# Patient Record
Sex: MALE | Race: NATIVE HAWAIIAN OR OTHER PACIFIC ISLANDER | NOT HISPANIC OR LATINO | URBAN - METROPOLITAN AREA
[De-identification: names, ages, dates, MRNs, and addresses within clinical notes are randomized per-mention and may not be internally consistent; named-entity substitution may affect disease eponyms.]

---

## 2022-06-25 ENCOUNTER — EMERGENCY (EMERGENCY)
Facility: HOSPITAL | Age: 42
LOS: 0 days | Discharge: HOME | End: 2022-06-26
Attending: EMERGENCY MEDICINE | Admitting: EMERGENCY MEDICINE

## 2022-06-25 VITALS
OXYGEN SATURATION: 97 % | SYSTOLIC BLOOD PRESSURE: 119 MMHG | TEMPERATURE: 97 F | DIASTOLIC BLOOD PRESSURE: 70 MMHG | HEART RATE: 112 BPM

## 2022-06-25 VITALS
OXYGEN SATURATION: 98 % | RESPIRATION RATE: 18 BRPM | DIASTOLIC BLOOD PRESSURE: 56 MMHG | SYSTOLIC BLOOD PRESSURE: 108 MMHG | HEART RATE: 83 BPM | TEMPERATURE: 98 F

## 2022-06-25 DIAGNOSIS — F12.920 CANNABIS USE, UNSPECIFIED WITH INTOXICATION, UNCOMPLICATED: ICD-10-CM

## 2022-06-25 PROCEDURE — 93010 ELECTROCARDIOGRAM REPORT: CPT

## 2022-06-25 PROCEDURE — 99284 EMERGENCY DEPT VISIT MOD MDM: CPT

## 2022-06-25 NOTE — ED ADULT NURSE NOTE - OBJECTIVE STATEMENT
Pt BIBA from home when patient was found unresponsive by parents. Upon EMS arrival pt was awake and alert. PT admits taking x6 THC gummies 500mg each with his normal home meds percocet. Pt denies any suicidal or homicidal ideation. Denies to wanting to hurt self or others. PT alert and cooperative in triage.

## 2022-06-25 NOTE — ED ADULT TRIAGE NOTE - CHIEF COMPLAINT QUOTE
Pt BIBA from home when patient was found unresponsive by parents. Upon EMS arrival pt was awake and alert. PT admits taking x6 THC gummies 500mg each with his normal home meds. Pt denies any suicidal or homicidal ideation. Denies to wanting to hurt self or others. PT alert and cooperative in triage.

## 2022-06-25 NOTE — ED PROVIDER NOTE - OBJECTIVE STATEMENT
Patient is a 41-year-old male, brought in by EMS for CBD ingestion. Per EMS patient's parents I found him not responding and became concerned prompting calling 911. In the ED, patient is alert, oriented X4, reporting that he had six CBD gummies, 10 mg each, despite being told try one at a time. States this is the first and only time he has ever ingested CBD. Denies any co-ingestion, denies any other substance use, including alcohol and tobacco. Feels well and has no complaints. Has complete recollection of the event, but states he does not know why he was unable to respond earlier. No other complaints - no fever/chills, rhinorrhea, sore throat, CP, palpitations, SOB, cough, abd pain, NVD, dysuria, hematuria, new joint pain, FND, rash, trauma.

## 2022-06-25 NOTE — ED PROVIDER NOTE - PHYSICAL EXAMINATION
_  CONSTITUTIONAL: NAD  SKIN: Warm, dry  HEAD: NCAT  EYES: Clear conjunctiva, PERRLA B/L  ENT: MMM  NECK: Supple  CARD: RRR, S1, S2; no M/R/G  RESP: Speaking in full sentences; CTAB  ABD: S/NT, no R/G  EXT: No pedal edema  NEURO: Grossly intact  PSYCH: Cooperative, appropriate

## 2022-06-25 NOTE — ED ADULT NURSE NOTE - NSIMPLEMENTINTERV_GEN_ALL_ED
Implemented All Fall Risk Interventions:  [place] to call system. Call bell, personal items and telephone within reach. Instruct patient to call for assistance. Room bathroom lighting operational. Non-slip footwear when patient is off stretcher. Physically safe environment: no spills, clutter or unnecessary equipment. Stretcher in lowest position, wheels locked, appropriate side rails in place. Provide visual cue, wrist band, yellow gown, etc. Monitor gait and stability. Monitor for mental status changes and reorient to person, place, and time. Review medications for side effects contributing to fall risk. Reinforce activity limits and safety measures with patient and family.

## 2022-06-25 NOTE — ED PROVIDER NOTE - PATIENT PORTAL LINK FT
You can access the FollowMyHealth Patient Portal offered by [facility] by registering at the following website: [email or web address]. By joining [employer]’s FollowMyHealth portal, you will also be able to view your health information using other applications (apps) compatible with our system.

## 2022-06-25 NOTE — ED PROVIDER NOTE - NSFOLLOWUPINSTRUCTIONS_ED_ALL_ED_FT
Your visit in the emergency department today did not reveal anything immediately life-threatening.    However, it is important that you follow-up with your PRIMARY CARE PHYSICIAN within 3-5 days.  If you do not have a primary care physician, please call your health insurance to select one.  If you do not have health insurance, please schedule an appointment with the [facility]: [phone], located at [address] [place] [address].    ---------------------------------------------------------------------------------------------------    Accidental Drug Poisoning, Adult  Accidental drug poisoning happens when a person accidentally takes too much of a substance, such as a prescription medicine, an over-the-counter medicine, a vitamin, a supplement, or an illegal drug. The effects of drug poisoning can be mild, dangerous, or even deadly.    What are the causes?  This condition is caused by taking too much of a medicine, illegal drug, or other substance. It often results from:  •Lack of knowledge about a substance.  •Using more than one substance at the same time.  •An error made by the health care provider who prescribed the substance.  •An error made by the pharmacist who filled the prescription.  •A lapse in memory, such as forgetting that you have already taken a dose of the medicine.  •Suddenly using a substance after a long period of not using it.    The following substances and medicines are more likely to cause an accidental drug poisoning:  •Medicines that treat mental problems (psychotropic medicines).  •Pain medicines.  •Cocaine.  •Heroin.  •Multivitamins that contain iron.  •Over-the-counter cold and cough medicines.    What increases the risk?  This condition is more likely to occur in:  •Elderly adults. Elderly adults are at risk because they may:  •Be taking many different medicines.  •Have difficulty reading labels.  •Forget when they last took their medicine.  •People who use illegal drugs.  •People who drink alcohol while using illegal drugs or certain medicines.  •People with certain mental health conditions.    What are the signs or symptoms?  Symptoms of this condition depend on the substance and the amount that was taken. Common symptoms include:  •Behavior changes, such as confusion.  •Sleepiness.  •Weakness.  •Slowed breathing.  •Nausea and vomiting.  •Seizures.  •Very large or small eye pupil size.    A drug poisoning can cause a very serious condition in which your blood pressure drops to a low level (shock). Symptoms of shock include:  •Cold and clammy skin.  •Pale skin.  •Blue lips.  •Very slow breathing.  •Extreme sleepiness.  •Severe confusion.  •Dizziness or fainting.    Follow these instructions at home:  Medicines   •Take over-the-counter and prescription medicines only as told by your health care provider.  •Before taking a new medicine, ask your health care provider whether the medicine:  •May cause side effects.  •Might react with other medicines.  •Keep a list of all the medicines that you take, including over-the-counter medicines, vitamins, supplements, and herbs. Bring this list with you to all of your medical visits.    General instructions   •Drink enough fluid to keep your urine pale yellow.  •If you are working with a counselor or mental health professional, make sure to follow his or her instructions.  •Do not drink alcohol if:  •Your health care provider tells you not to drink.  •You are pregnant, may be pregnant, or are planning to become pregnant.  •If you drink alcohol, limit how much you have:  •0–1 drink a day for women.  •0–2 drinks a day for men.  •Be aware of how much alcohol is in your drink. In the [place] one drink equals one typical bottle of beer (12 oz), one-half glass of wine (5 oz), or one shot of hard liquor (1½ oz).  •Keep all follow-up visits as told by your health care provider. This is important.    How is this prevented?  •Get help if you are struggling with:  •Alcohol or drug use.  •Depression or another mental health problem.  •Keep the phone number of your local poison control center near your phone or on your cell phone. The hotline of the [employer] Association of Poison Control Centers is (601) 219-2108.  •Store all medicines in safety containers that are out of the reach of children.  •Read the drug inserts that come with your medicines.  •Create a system for taking your medicine, such as a pillbox, that will help you avoid taking too much of the medicine.  • Do not drink alcohol while taking medicines unless your health care provider approves.  • Do not use illegal drugs.  • Do not take medicines that are not prescribed for you.    Contact a health care provider if:  •Your symptoms return.  •You develop new symptoms or side effects after taking a medicine.  •You have questions about possible drug poisoning. Call your local poison control center at (156) 223-2469.    Get help right away if:  •You think that you or someone else may have taken too much of a substance.  •You or someone else is having symptoms of drug poisoning.    Summary  •Accidental drug poisoning happens when a person accidentally takes too much of a substance, such as a prescription medicine, an over-the-counter medicine, a vitamin, a supplement, or an illegal drug.  •This condition is diagnosed based on your symptoms and a physical exam. You will be asked to tell your health care provider which substances you took and when you took them.  •The effects of drug poisoning can be mild, dangerous, or even deadly.  •This condition may need to be treated right away at the hospital.    This information is not intended to replace advice given to you by your health care provider. Make sure you discuss any questions you have with your health care provider.  Document Revised: 11/08/2021 Document Reviewed: 11/08/2021  Elsevier Patient Education © 2022 [employer]

## 2022-06-25 NOTE — ED PROVIDER NOTE - CLINICAL SUMMARY MEDICAL DECISION MAKING FREE TEXT BOX
41-year-old male, no past medical history, brought in by ambulance for CBD ingestion.  As per EMS, parents found patient unresponsive and called 911.  Patient is awake and alert now, reports he had 6 CBD Gummies 10 mg each.  States it was his first time trying it.  Denies any other drug use, alcohol use.  Has no complaints right now.  No fever/chills, chest pain/shortness of breath, abdominal pain, nausea/vomiting/constipation/diarrhea, urinary symptoms.  On exam, pt in NAD, AAOx3, head NC/AT, CN II-XII intact, lungs CTA B/L, CV S1S2 regular, abdomen soft/NT/ND/(+)BS, ext (-) edema, motor 5/5x4, sensation intact.  EKG/FS done and reviewed. Pt observed. Feels better. Will d/c.

## 2022-06-25 NOTE — ED PROVIDER NOTE - PROGRESS NOTE DETAILS
Resident AO: Patient completely asymptomatic and has full recollection of the event. Advised to avoid CBD and THC. All questions answered, patient expressed understanding, and agreed with plan. Return precautions given.

## 2024-04-15 ENCOUNTER — EMERGENCY (EMERGENCY)
Facility: HOSPITAL | Age: 44
LOS: 0 days | Discharge: HOME | End: 2024-04-15
Attending: EMERGENCY MEDICINE
Payer: MEDICAID

## 2024-04-15 VITALS
WEIGHT: 160.06 LBS | HEIGHT: 67 IN | TEMPERATURE: 98 F | HEART RATE: 87 BPM | OXYGEN SATURATION: 98 % | RESPIRATION RATE: 18 BRPM | DIASTOLIC BLOOD PRESSURE: 99 MMHG | SYSTOLIC BLOOD PRESSURE: 146 MMHG

## 2024-04-15 DIAGNOSIS — S50.311A ABRASION OF RIGHT ELBOW, INITIAL ENCOUNTER: ICD-10-CM

## 2024-04-15 DIAGNOSIS — S80.211A ABRASION, RIGHT KNEE, INITIAL ENCOUNTER: ICD-10-CM
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
## 2024-04-15 DIAGNOSIS — S50.312A ABRASION OF LEFT ELBOW, INITIAL ENCOUNTER: ICD-10-CM

## 2024-04-15 DIAGNOSIS — F31.9 BIPOLAR DISORDER, UNSPECIFIED: ICD-10-CM

## 2024-04-15 DIAGNOSIS — S00.83XA CONTUSION OF OTHER PART OF HEAD, INITIAL ENCOUNTER: ICD-10-CM
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
## 2024-04-15 PROBLEM — Z78.9 OTHER SPECIFIED HEALTH STATUS: Chronic | Status: ACTIVE | Noted: 2022-06-28

## 2024-04-15 PROCEDURE — 99284 EMERGENCY DEPT VISIT MOD MDM: CPT | Mod: 25

## 2024-04-15 PROCEDURE — 70486 CT MAXILLOFACIAL W/O DYE: CPT | Mod: MC

## 2024-04-15 PROCEDURE — 70486 CT MAXILLOFACIAL W/O DYE: CPT | Mod: 26,MC

## 2024-04-15 PROCEDURE — 99284 EMERGENCY DEPT VISIT MOD MDM: CPT

## 2024-04-15 PROCEDURE — 70450 CT HEAD/BRAIN W/O DYE: CPT | Mod: MC

## 2024-04-15 PROCEDURE — 70450 CT HEAD/BRAIN W/O DYE: CPT | Mod: 26,MC

## 2024-04-15 NOTE — ED ADULT TRIAGE NOTE - CHIEF COMPLAINT QUOTE
Pt presents to the ED c/o of mechanical fall over a curb. (+) HT (-) LOC (-) A/C. Pt with Abrasion to R arm and R face. Pt with small Lac noted to R face also. Bleeding controlled in triage.

## 2024-04-15 NOTE — ED PROVIDER NOTE - PROGRESS NOTE DETAILS
wounds cleansed and dressed, no sutures needed. Pt counseled - warning si/sxs d/w pt. Pt instructed to return to ed or f/u with PCP should sxs persist/worsen. Pt verbalizes an understanding & agreement with the plan as discussed

## 2024-04-15 NOTE — ED PROVIDER NOTE - PHYSICAL EXAMINATION
CONSTITUTIONAL: Well-appearing; well-nourished; in no apparent distress.   EYES: PERRL; EOM intact.   ENT: normal nose; no rhinorrhea; normal pharynx with no tonsillar hypertrophy.   NECK: Supple; non-tender; no cervical lymphadenopathy.   CARDIOVASCULAR: Normal S1, S2; no murmurs, rubs, or gallops.   RESPIRATORY: Normal chest excursion with respiration; breath sounds clear and equal bilaterally; no wheezes, rhonchi, or rales.  GI/: Non-distended; non-tender; no palpable organomegaly.   MS: No CVA tenderness. Normal ROM in all four extremities; non-tender to palpation; distal pulses are normal.   SKIN: multiple abrasions and ecchymosis to R side of face/nose/forehead; abrasions to b/l UE and RLE, otherwise warm; dry; good turgor; no apparent lesions or exudate.   NEURO/PSYCH: A & O x 4; grossly unremarkable. mood and manner are appropriate.

## 2024-04-15 NOTE — ED PROVIDER NOTE - PATIENT PORTAL LINK FT
You can access the FollowMyHealth Patient Portal offered by [facility] by registering at the following website: [email or web address]. By joining [employer]’s FollowMyHealth portal, you will also be able to view your health information using other applications (apps) compatible with our system.

## 2024-04-15 NOTE — ED PROVIDER NOTE - ATTENDING APP SHARED VISIT CONTRIBUTION OF CARE
43-year-old male past medical history of bipolar here status post mechanical fall.  Patient was walking and tripped on the curb and hit his face.  No LOC.  Patient is not on blood thinners.  No numbness no weakness.  Patient is up-to-date with tetanus.  Patient only complaining of right facial pain.  No other complaints.  Neck supple.  No neck tenderness.  Motor sensation intact in all 4 extremities.  Patient breathing comfortably.  Patient with multiple abrasions to upper extremity.  Motor sensation intact in upper extremities.  No extremity pain.  Abdomen soft nontender.  Pelvis stable.  No hip tenderness.  Patient with abrasions to right side of face with swelling.  No laceration seen.  Extraocular movements intact.  Nonfocal neuroexam.  CT negative for anything acute.  Patient discharged with outpatient follow-up.